# Patient Record
Sex: MALE | Race: WHITE
[De-identification: names, ages, dates, MRNs, and addresses within clinical notes are randomized per-mention and may not be internally consistent; named-entity substitution may affect disease eponyms.]

---

## 2020-07-22 ENCOUNTER — HOSPITAL ENCOUNTER (OUTPATIENT)
Dept: HOSPITAL 8 - CVU | Age: 50
Discharge: HOME | End: 2020-07-22
Attending: FAMILY MEDICINE
Payer: COMMERCIAL

## 2020-07-22 DIAGNOSIS — E78.2: ICD-10-CM

## 2020-07-22 DIAGNOSIS — I70.203: Primary | ICD-10-CM

## 2020-07-22 DIAGNOSIS — E11.622: ICD-10-CM

## 2020-07-22 DIAGNOSIS — L98.499: ICD-10-CM

## 2020-07-22 DIAGNOSIS — E11.65: ICD-10-CM

## 2020-07-22 PROCEDURE — 93922 UPR/L XTREMITY ART 2 LEVELS: CPT

## 2020-07-22 PROCEDURE — 93925 LOWER EXTREMITY STUDY: CPT

## 2020-07-31 ENCOUNTER — HOSPITAL ENCOUNTER (OUTPATIENT)
Dept: HOSPITAL 8 - WOUND | Age: 50
Discharge: HOME | End: 2020-07-31
Attending: FAMILY MEDICINE
Payer: COMMERCIAL

## 2020-07-31 DIAGNOSIS — E11.622: ICD-10-CM

## 2020-07-31 DIAGNOSIS — L97.811: ICD-10-CM

## 2020-07-31 DIAGNOSIS — E66.01: ICD-10-CM

## 2020-07-31 DIAGNOSIS — I87.313: Primary | ICD-10-CM

## 2020-07-31 DIAGNOSIS — L97.821: ICD-10-CM

## 2020-07-31 DIAGNOSIS — E78.5: ICD-10-CM

## 2020-07-31 DIAGNOSIS — L40.8: ICD-10-CM

## 2020-07-31 DIAGNOSIS — E11.21: ICD-10-CM

## 2020-07-31 PROCEDURE — 99214 OFFICE O/P EST MOD 30 MIN: CPT

## 2020-08-07 ENCOUNTER — HOSPITAL ENCOUNTER (OUTPATIENT)
Dept: HOSPITAL 8 - WOUND | Age: 50
Discharge: HOME | End: 2020-08-07
Attending: FAMILY MEDICINE
Payer: COMMERCIAL

## 2020-08-07 DIAGNOSIS — L40.8: ICD-10-CM

## 2020-08-07 DIAGNOSIS — E66.01: ICD-10-CM

## 2020-08-07 DIAGNOSIS — E78.2: ICD-10-CM

## 2020-08-07 DIAGNOSIS — I70.245: ICD-10-CM

## 2020-08-07 DIAGNOSIS — I87.313: Primary | ICD-10-CM

## 2020-08-07 DIAGNOSIS — E11.21: ICD-10-CM

## 2020-08-07 DIAGNOSIS — E11.621: ICD-10-CM

## 2020-08-07 DIAGNOSIS — I70.235: ICD-10-CM

## 2020-08-07 DIAGNOSIS — L97.521: ICD-10-CM

## 2020-08-07 DIAGNOSIS — L97.511: ICD-10-CM

## 2020-08-07 PROCEDURE — 99214 OFFICE O/P EST MOD 30 MIN: CPT

## 2020-08-21 ENCOUNTER — HOSPITAL ENCOUNTER (OUTPATIENT)
Dept: HOSPITAL 8 - WOUND | Age: 50
Discharge: HOME | End: 2020-08-21
Attending: FAMILY MEDICINE
Payer: COMMERCIAL

## 2020-08-21 DIAGNOSIS — L97.521: ICD-10-CM

## 2020-08-21 DIAGNOSIS — E11.621: ICD-10-CM

## 2020-08-21 DIAGNOSIS — L40.8: ICD-10-CM

## 2020-08-21 DIAGNOSIS — E11.21: ICD-10-CM

## 2020-08-21 DIAGNOSIS — I87.313: Primary | ICD-10-CM

## 2020-08-21 DIAGNOSIS — E66.01: ICD-10-CM

## 2020-08-21 DIAGNOSIS — E78.2: ICD-10-CM

## 2020-08-21 DIAGNOSIS — L97.511: ICD-10-CM

## 2020-08-21 PROCEDURE — 99214 OFFICE O/P EST MOD 30 MIN: CPT

## 2020-09-04 ENCOUNTER — HOSPITAL ENCOUNTER (OUTPATIENT)
Dept: HOSPITAL 8 - WOUND | Age: 50
Discharge: HOME | End: 2020-09-04
Attending: FAMILY MEDICINE
Payer: COMMERCIAL

## 2020-09-04 DIAGNOSIS — I87.313: Primary | ICD-10-CM

## 2020-09-04 DIAGNOSIS — E78.2: ICD-10-CM

## 2020-09-04 DIAGNOSIS — L97.528: ICD-10-CM

## 2020-09-04 DIAGNOSIS — L97.518: ICD-10-CM

## 2020-09-04 DIAGNOSIS — E66.01: ICD-10-CM

## 2020-09-04 DIAGNOSIS — L40.8: ICD-10-CM

## 2020-09-04 DIAGNOSIS — E11.21: ICD-10-CM

## 2020-09-04 DIAGNOSIS — E11.621: ICD-10-CM

## 2020-09-04 PROCEDURE — 99213 OFFICE O/P EST LOW 20 MIN: CPT

## 2020-10-01 ENCOUNTER — HOSPITAL ENCOUNTER (INPATIENT)
Dept: HOSPITAL 8 - ED | Age: 50
LOS: 2 days | Discharge: HOME | DRG: 603 | End: 2020-10-03
Attending: INTERNAL MEDICINE | Admitting: HOSPITALIST
Payer: COMMERCIAL

## 2020-10-01 VITALS — HEIGHT: 71 IN | BODY MASS INDEX: 44.1 KG/M2 | WEIGHT: 315 LBS

## 2020-10-01 VITALS — SYSTOLIC BLOOD PRESSURE: 119 MMHG | DIASTOLIC BLOOD PRESSURE: 86 MMHG

## 2020-10-01 DIAGNOSIS — B35.4: ICD-10-CM

## 2020-10-01 DIAGNOSIS — E87.2: ICD-10-CM

## 2020-10-01 DIAGNOSIS — E11.9: ICD-10-CM

## 2020-10-01 DIAGNOSIS — L03.116: Primary | ICD-10-CM

## 2020-10-01 DIAGNOSIS — I10: ICD-10-CM

## 2020-10-01 DIAGNOSIS — E78.5: ICD-10-CM

## 2020-10-01 DIAGNOSIS — B95.5: ICD-10-CM

## 2020-10-01 DIAGNOSIS — L03.115: ICD-10-CM

## 2020-10-01 DIAGNOSIS — E66.9: ICD-10-CM

## 2020-10-01 LAB
ALBUMIN SERPL-MCNC: 3.3 G/DL (ref 3.4–5)
ALP SERPL-CCNC: 52 U/L (ref 45–117)
ALT SERPL-CCNC: 27 U/L (ref 12–78)
ANION GAP SERPL CALC-SCNC: 6 MMOL/L (ref 5–15)
BASOPHILS # BLD AUTO: 0.04 X10^3/UL (ref 0–0.1)
BASOPHILS NFR BLD AUTO: 0 % (ref 0–1)
BILIRUB SERPL-MCNC: 0.4 MG/DL (ref 0.2–1)
CALCIUM SERPL-MCNC: 9.8 MG/DL (ref 8.5–10.1)
CHLORIDE SERPL-SCNC: 99 MMOL/L (ref 98–107)
CREAT SERPL-MCNC: 1.26 MG/DL (ref 0.7–1.3)
EOSINOPHIL # BLD AUTO: 0.12 X10^3/UL (ref 0–0.4)
EOSINOPHIL NFR BLD AUTO: 1 % (ref 1–7)
ERYTHROCYTE [DISTWIDTH] IN BLOOD BY AUTOMATED COUNT: 14.6 % (ref 9.4–14.8)
EST. AVERAGE GLUCOSE BLD GHB EST-MCNC: 174 MG/DL (ref 0–126)
LYMPHOCYTES # BLD AUTO: 1.17 X10^3/UL (ref 1–3.4)
LYMPHOCYTES NFR BLD AUTO: 10 % (ref 22–44)
MCH RBC QN AUTO: 26.9 PG (ref 27.5–34.5)
MCHC RBC AUTO-ENTMCNC: 32.5 G/DL (ref 33.2–36.2)
MD: NO
MICROSCOPIC: (no result)
MONOCYTES # BLD AUTO: 1 X10^3/UL (ref 0.2–0.8)
MONOCYTES NFR BLD AUTO: 8 % (ref 2–9)
NEUTROPHILS # BLD AUTO: 9.55 X10^3/UL (ref 1.8–6.8)
NEUTROPHILS NFR BLD AUTO: 80 % (ref 42–75)
PLATELET # BLD AUTO: 292 X10^3/UL (ref 130–400)
PMV BLD AUTO: 8.7 FL (ref 7.4–10.4)
PROT SERPL-MCNC: 8.4 G/DL (ref 6.4–8.2)
RBC # BLD AUTO: 5.88 X10^6/UL (ref 4.38–5.82)

## 2020-10-01 PROCEDURE — 85025 COMPLETE CBC W/AUTO DIFF WBC: CPT

## 2020-10-01 PROCEDURE — 90686 IIV4 VACC NO PRSV 0.5 ML IM: CPT

## 2020-10-01 PROCEDURE — 83036 HEMOGLOBIN GLYCOSYLATED A1C: CPT

## 2020-10-01 PROCEDURE — 96372 THER/PROPH/DIAG INJ SC/IM: CPT

## 2020-10-01 PROCEDURE — 96360 HYDRATION IV INFUSION INIT: CPT

## 2020-10-01 PROCEDURE — 82962 GLUCOSE BLOOD TEST: CPT

## 2020-10-01 PROCEDURE — 81001 URINALYSIS AUTO W/SCOPE: CPT

## 2020-10-01 PROCEDURE — 36415 COLL VENOUS BLD VENIPUNCTURE: CPT

## 2020-10-01 PROCEDURE — 93005 ELECTROCARDIOGRAM TRACING: CPT

## 2020-10-01 PROCEDURE — 83605 ASSAY OF LACTIC ACID: CPT

## 2020-10-01 PROCEDURE — 87040 BLOOD CULTURE FOR BACTERIA: CPT

## 2020-10-01 PROCEDURE — 80053 COMPREHEN METABOLIC PANEL: CPT

## 2020-10-01 PROCEDURE — 80048 BASIC METABOLIC PNL TOTAL CA: CPT

## 2020-10-01 RX ADMIN — ATORVASTATIN CALCIUM SCH MG: 10 TABLET, FILM COATED ORAL at 21:27

## 2020-10-01 RX ADMIN — HEPARIN SODIUM SCH UNITS: 5000 INJECTION, SOLUTION INTRAVENOUS; SUBCUTANEOUS at 20:30

## 2020-10-01 RX ADMIN — INSULIN HUMAN SCH UNITS: 100 INJECTION, SOLUTION PARENTERAL at 21:28

## 2020-10-01 RX ADMIN — AMPICILLIN SODIUM AND SULBACTAM SODIUM SCH MLS/HR: 2; 1 INJECTION, POWDER, FOR SOLUTION INTRAMUSCULAR; INTRAVENOUS at 19:06

## 2020-10-01 RX ADMIN — DOXYCYCLINE SCH MLS/HR: 100 INJECTION, POWDER, LYOPHILIZED, FOR SOLUTION INTRAVENOUS at 20:33

## 2020-10-01 NOTE — NUR
piv placed from which 1 set of blood cultures drawn, lab at bedside for rest of 
septic workup labs including 2nd set of blood cultures

cc ua obtained and sent as well

## 2020-10-01 NOTE — NUR
PRIOR TO TRANSFER UPSTAIRS PROVIDED WITH MEAL, TOLD TO WAIT UNTIL ACCUCHECK FOR 
SLIDING SCALE COVERAGE



UN ADMINISTERED DOXYCYCLINE SENT WITH PATIENT

## 2020-10-01 NOTE — NUR
SEEN [facility]  TELEMEDICINE. CELLULITIS OF RIGHT THIGH AND INTO GROIN.

DIABETIC

HAS HAD CHILLS AND TMAX OF 99. -130

PROVIDER TO BEDSIDE: FULL SEPSIS WORKUP AND ADMIT



LINE OF DEMARCATION TO RIGHT THIGH DRAWN AT 1700 10/1/20

## 2020-10-01 NOTE — NUR
REDNESS RECEDING FROM LINE OF DEMARCATION

MEDICATED FOR PAIN AS WELL AS INPATIENT ABX 1/2 AS WELL AS MIVF

## 2020-10-02 VITALS — DIASTOLIC BLOOD PRESSURE: 86 MMHG | SYSTOLIC BLOOD PRESSURE: 131 MMHG

## 2020-10-02 VITALS — SYSTOLIC BLOOD PRESSURE: 109 MMHG | DIASTOLIC BLOOD PRESSURE: 62 MMHG

## 2020-10-02 VITALS — SYSTOLIC BLOOD PRESSURE: 107 MMHG | DIASTOLIC BLOOD PRESSURE: 71 MMHG

## 2020-10-02 VITALS — SYSTOLIC BLOOD PRESSURE: 106 MMHG | DIASTOLIC BLOOD PRESSURE: 66 MMHG

## 2020-10-02 LAB
ANION GAP SERPL CALC-SCNC: 5 MMOL/L (ref 5–15)
BASOPHILS # BLD AUTO: 0.03 X10^3/UL (ref 0–0.1)
BASOPHILS NFR BLD AUTO: 0 % (ref 0–1)
CALCIUM SERPL-MCNC: 9.1 MG/DL (ref 8.5–10.1)
CHLORIDE SERPL-SCNC: 105 MMOL/L (ref 98–107)
CREAT SERPL-MCNC: 1.09 MG/DL (ref 0.7–1.3)
EOSINOPHIL # BLD AUTO: 0.41 X10^3/UL (ref 0–0.4)
EOSINOPHIL NFR BLD AUTO: 4 % (ref 1–7)
ERYTHROCYTE [DISTWIDTH] IN BLOOD BY AUTOMATED COUNT: 14.8 % (ref 9.4–14.8)
LYMPHOCYTES # BLD AUTO: 1.61 X10^3/UL (ref 1–3.4)
LYMPHOCYTES NFR BLD AUTO: 15 % (ref 22–44)
MCH RBC QN AUTO: 26.4 PG (ref 27.5–34.5)
MCHC RBC AUTO-ENTMCNC: 31.7 G/DL (ref 33.2–36.2)
MD: NO
MONOCYTES # BLD AUTO: 1.38 X10^3/UL (ref 0.2–0.8)
MONOCYTES NFR BLD AUTO: 13 % (ref 2–9)
NEUTROPHILS # BLD AUTO: 7.63 X10^3/UL (ref 1.8–6.8)
NEUTROPHILS NFR BLD AUTO: 69 % (ref 42–75)
PLATELET # BLD AUTO: 271 X10^3/UL (ref 130–400)
PMV BLD AUTO: 8.5 FL (ref 7.4–10.4)
RBC # BLD AUTO: 5.51 X10^6/UL (ref 4.38–5.82)

## 2020-10-02 RX ADMIN — AMPICILLIN SODIUM AND SULBACTAM SODIUM SCH MLS/HR: 2; 1 INJECTION, POWDER, FOR SOLUTION INTRAMUSCULAR; INTRAVENOUS at 18:37

## 2020-10-02 RX ADMIN — LISINOPRIL SCH MG: 5 TABLET ORAL at 07:55

## 2020-10-02 RX ADMIN — KETOCONAZOLE SCH APPLIC: 20 CREAM TOPICAL at 16:08

## 2020-10-02 RX ADMIN — HEPARIN SODIUM SCH UNITS: 5000 INJECTION, SOLUTION INTRAVENOUS; SUBCUTANEOUS at 03:11

## 2020-10-02 RX ADMIN — DOXYCYCLINE SCH MLS/HR: 100 INJECTION, POWDER, LYOPHILIZED, FOR SOLUTION INTRAVENOUS at 09:22

## 2020-10-02 RX ADMIN — INSULIN HUMAN SCH UNITS: 100 INJECTION, SOLUTION PARENTERAL at 16:21

## 2020-10-02 RX ADMIN — INSULIN HUMAN SCH UNITS: 100 INJECTION, SOLUTION PARENTERAL at 20:58

## 2020-10-02 RX ADMIN — INSULIN HUMAN SCH UNITS: 100 INJECTION, SOLUTION PARENTERAL at 11:40

## 2020-10-02 RX ADMIN — AMPICILLIN SODIUM AND SULBACTAM SODIUM SCH MLS/HR: 2; 1 INJECTION, POWDER, FOR SOLUTION INTRAMUSCULAR; INTRAVENOUS at 03:07

## 2020-10-02 RX ADMIN — INSULIN HUMAN SCH UNITS: 100 INJECTION, SOLUTION PARENTERAL at 08:06

## 2020-10-02 RX ADMIN — HEPARIN SODIUM SCH UNITS: 5000 INJECTION, SOLUTION INTRAVENOUS; SUBCUTANEOUS at 12:30

## 2020-10-02 RX ADMIN — LINAGLIPTIN SCH MG: 5 TABLET, FILM COATED ORAL at 08:06

## 2020-10-02 RX ADMIN — BUDESONIDE AND FORMOTEROL FUMARATE DIHYDRATE SCH TAB: 160; 4.5 AEROSOL RESPIRATORY (INHALATION) at 09:00

## 2020-10-02 RX ADMIN — DOXYCYCLINE SCH MLS/HR: 100 INJECTION, POWDER, LYOPHILIZED, FOR SOLUTION INTRAVENOUS at 20:58

## 2020-10-02 RX ADMIN — ACETAMINOPHEN PRN MG: 325 TABLET, FILM COATED ORAL at 11:38

## 2020-10-02 RX ADMIN — KETOCONAZOLE SCH APPLIC: 20 CREAM TOPICAL at 21:28

## 2020-10-02 RX ADMIN — FENOFIBRATE SCH MG: 145 TABLET, FILM COATED ORAL at 07:56

## 2020-10-02 RX ADMIN — AMPICILLIN SODIUM AND SULBACTAM SODIUM SCH MLS/HR: 2; 1 INJECTION, POWDER, FOR SOLUTION INTRAMUSCULAR; INTRAVENOUS at 11:18

## 2020-10-02 RX ADMIN — ATORVASTATIN CALCIUM SCH MG: 10 TABLET, FILM COATED ORAL at 20:57

## 2020-10-02 RX ADMIN — HEPARIN SODIUM SCH UNITS: 5000 INJECTION, SOLUTION INTRAVENOUS; SUBCUTANEOUS at 20:30

## 2020-10-03 VITALS — SYSTOLIC BLOOD PRESSURE: 110 MMHG | DIASTOLIC BLOOD PRESSURE: 70 MMHG

## 2020-10-03 VITALS — DIASTOLIC BLOOD PRESSURE: 79 MMHG | SYSTOLIC BLOOD PRESSURE: 120 MMHG

## 2020-10-03 RX ADMIN — ACETAMINOPHEN PRN MG: 325 TABLET, FILM COATED ORAL at 09:34

## 2020-10-03 RX ADMIN — FENOFIBRATE SCH MG: 145 TABLET, FILM COATED ORAL at 08:46

## 2020-10-03 RX ADMIN — INSULIN HUMAN SCH UNITS: 100 INJECTION, SOLUTION PARENTERAL at 11:38

## 2020-10-03 RX ADMIN — KETOCONAZOLE SCH APPLIC: 20 CREAM TOPICAL at 09:00

## 2020-10-03 RX ADMIN — AMPICILLIN SODIUM AND SULBACTAM SODIUM SCH MLS/HR: 2; 1 INJECTION, POWDER, FOR SOLUTION INTRAMUSCULAR; INTRAVENOUS at 03:00

## 2020-10-03 RX ADMIN — HEPARIN SODIUM SCH UNITS: 5000 INJECTION, SOLUTION INTRAVENOUS; SUBCUTANEOUS at 11:34

## 2020-10-03 RX ADMIN — LISINOPRIL SCH MG: 5 TABLET ORAL at 08:46

## 2020-10-03 RX ADMIN — AMPICILLIN SODIUM AND SULBACTAM SODIUM SCH MLS/HR: 2; 1 INJECTION, POWDER, FOR SOLUTION INTRAMUSCULAR; INTRAVENOUS at 11:28

## 2020-10-03 RX ADMIN — DOXYCYCLINE SCH MLS/HR: 100 INJECTION, POWDER, LYOPHILIZED, FOR SOLUTION INTRAVENOUS at 09:33

## 2020-10-03 RX ADMIN — HEPARIN SODIUM SCH UNITS: 5000 INJECTION, SOLUTION INTRAVENOUS; SUBCUTANEOUS at 03:07

## 2020-10-03 RX ADMIN — BUDESONIDE AND FORMOTEROL FUMARATE DIHYDRATE SCH TAB: 160; 4.5 AEROSOL RESPIRATORY (INHALATION) at 08:44

## 2020-10-03 RX ADMIN — INSULIN HUMAN SCH UNITS: 100 INJECTION, SOLUTION PARENTERAL at 07:38

## 2020-10-03 RX ADMIN — LINAGLIPTIN SCH MG: 5 TABLET, FILM COATED ORAL at 08:46

## 2024-10-22 ENCOUNTER — HOSPITAL ENCOUNTER (OUTPATIENT)
Facility: MEDICAL CENTER | Age: 54
End: 2024-10-22
Attending: STUDENT IN AN ORGANIZED HEALTH CARE EDUCATION/TRAINING PROGRAM
Payer: COMMERCIAL

## 2024-10-22 ENCOUNTER — OFFICE VISIT (OUTPATIENT)
Dept: MEDICAL GROUP | Facility: PHYSICIAN GROUP | Age: 54
End: 2024-10-22
Payer: COMMERCIAL

## 2024-10-22 VITALS
RESPIRATION RATE: 14 BRPM | SYSTOLIC BLOOD PRESSURE: 122 MMHG | TEMPERATURE: 96.5 F | BODY MASS INDEX: 44.1 KG/M2 | OXYGEN SATURATION: 94 % | DIASTOLIC BLOOD PRESSURE: 80 MMHG | HEIGHT: 71 IN | HEART RATE: 96 BPM | WEIGHT: 315 LBS

## 2024-10-22 DIAGNOSIS — E11.9 TYPE 2 DIABETES MELLITUS WITHOUT COMPLICATION, WITHOUT LONG-TERM CURRENT USE OF INSULIN (HCC): ICD-10-CM

## 2024-10-22 DIAGNOSIS — Z00.00 ANNUAL PHYSICAL EXAM: ICD-10-CM

## 2024-10-22 DIAGNOSIS — E78.2 MIXED HYPERLIPIDEMIA: ICD-10-CM

## 2024-10-22 DIAGNOSIS — Z11.59 NEED FOR HEPATITIS C SCREENING TEST: ICD-10-CM

## 2024-10-22 DIAGNOSIS — E66.01 MORBID OBESITY WITH BMI OF 45.0-49.9, ADULT (HCC): ICD-10-CM

## 2024-10-22 LAB
CREAT UR-MCNC: 77.44 MG/DL
MICROALBUMIN UR-MCNC: 4.2 MG/DL
MICROALBUMIN/CREAT UR: 54 MG/G (ref 0–30)

## 2024-10-22 PROCEDURE — 82043 UR ALBUMIN QUANTITATIVE: CPT

## 2024-10-22 PROCEDURE — 82570 ASSAY OF URINE CREATININE: CPT

## 2024-10-22 PROCEDURE — 99386 PREV VISIT NEW AGE 40-64: CPT | Performed by: STUDENT IN AN ORGANIZED HEALTH CARE EDUCATION/TRAINING PROGRAM

## 2024-10-22 RX ORDER — TAMSULOSIN HYDROCHLORIDE 0.4 MG/1
CAPSULE ORAL
COMMUNITY
End: 2024-10-22

## 2024-10-22 RX ORDER — HYDROCHLOROTHIAZIDE 25 MG/1
TABLET ORAL
COMMUNITY
End: 2024-10-22

## 2024-10-22 RX ORDER — DAPAGLIFLOZIN 10 MG/1
1 TABLET, FILM COATED ORAL DAILY
COMMUNITY
End: 2024-10-22

## 2024-10-22 RX ORDER — LISINOPRIL 5 MG/1
1 TABLET ORAL DAILY
COMMUNITY
End: 2024-10-22

## 2024-10-22 RX ORDER — FLUTICASONE PROPIONATE 50 MCG
SPRAY, SUSPENSION (ML) NASAL
COMMUNITY

## 2024-10-22 RX ORDER — FENOFIBRATE 145 MG/1
TABLET, COATED ORAL
COMMUNITY
End: 2024-10-22

## 2024-10-22 RX ORDER — ROSUVASTATIN CALCIUM 40 MG/1
40 TABLET, COATED ORAL DAILY
Qty: 100 TABLET | Refills: 2 | Status: SHIPPED | OUTPATIENT
Start: 2024-10-22

## 2024-10-22 RX ORDER — PIOGLITAZONE HCL AND METFORMIN HCL 850; 15 MG/1; MG/1
TABLET ORAL
COMMUNITY

## 2024-10-22 RX ORDER — ORAL SEMAGLUTIDE 7 MG/1
TABLET ORAL
COMMUNITY
End: 2024-10-22

## 2024-10-22 RX ORDER — ROSUVASTATIN CALCIUM 40 MG/1
1 TABLET, COATED ORAL DAILY
COMMUNITY
End: 2024-10-22 | Stop reason: SDUPTHER

## 2024-10-22 ASSESSMENT — ENCOUNTER SYMPTOMS
NAUSEA: 0
CHILLS: 0
WHEEZING: 0
PALPITATIONS: 0
DIZZINESS: 0
COUGH: 0
SHORTNESS OF BREATH: 0
ABDOMINAL PAIN: 0
VOMITING: 0
FEVER: 0
ORTHOPNEA: 0
HEARTBURN: 0
HEADACHES: 0
FOCAL WEAKNESS: 0

## 2024-10-22 ASSESSMENT — PATIENT HEALTH QUESTIONNAIRE - PHQ9: CLINICAL INTERPRETATION OF PHQ2 SCORE: 0

## 2024-11-05 ENCOUNTER — OFFICE VISIT (OUTPATIENT)
Dept: MEDICAL GROUP | Facility: PHYSICIAN GROUP | Age: 54
End: 2024-11-05
Payer: COMMERCIAL

## 2024-11-05 VITALS
OXYGEN SATURATION: 95 % | BODY MASS INDEX: 44.1 KG/M2 | HEART RATE: 97 BPM | DIASTOLIC BLOOD PRESSURE: 79 MMHG | SYSTOLIC BLOOD PRESSURE: 138 MMHG | WEIGHT: 315 LBS | TEMPERATURE: 98 F | HEIGHT: 71 IN | RESPIRATION RATE: 16 BRPM

## 2024-11-05 DIAGNOSIS — E11.29 TYPE 2 DIABETES MELLITUS WITH DIABETIC MICROALBUMINURIA, WITHOUT LONG-TERM CURRENT USE OF INSULIN (HCC): ICD-10-CM

## 2024-11-05 DIAGNOSIS — E78.1 HIGH TRIGLYCERIDES: ICD-10-CM

## 2024-11-05 DIAGNOSIS — R80.9 TYPE 2 DIABETES MELLITUS WITH DIABETIC MICROALBUMINURIA, WITHOUT LONG-TERM CURRENT USE OF INSULIN (HCC): ICD-10-CM

## 2024-11-05 DIAGNOSIS — E11.9 TYPE 2 DIABETES MELLITUS WITHOUT COMPLICATION, WITHOUT LONG-TERM CURRENT USE OF INSULIN (HCC): Primary | ICD-10-CM

## 2024-11-05 LAB
HBA1C MFR BLD: 9.4 % (ref ?–5.8)
POCT INT CON NEG: NEGATIVE
POCT INT CON POS: POSITIVE

## 2024-11-05 PROCEDURE — 83036 HEMOGLOBIN GLYCOSYLATED A1C: CPT | Performed by: STUDENT IN AN ORGANIZED HEALTH CARE EDUCATION/TRAINING PROGRAM

## 2024-11-05 PROCEDURE — 99214 OFFICE O/P EST MOD 30 MIN: CPT | Performed by: STUDENT IN AN ORGANIZED HEALTH CARE EDUCATION/TRAINING PROGRAM

## 2024-11-05 PROCEDURE — 3078F DIAST BP <80 MM HG: CPT | Performed by: STUDENT IN AN ORGANIZED HEALTH CARE EDUCATION/TRAINING PROGRAM

## 2024-11-05 PROCEDURE — 3074F SYST BP LT 130 MM HG: CPT | Performed by: STUDENT IN AN ORGANIZED HEALTH CARE EDUCATION/TRAINING PROGRAM

## 2024-11-05 RX ORDER — CHLORAL HYDRATE 500 MG
1000 CAPSULE ORAL 2 TIMES DAILY
Qty: 180 CAPSULE | Refills: 2 | Status: SHIPPED | OUTPATIENT
Start: 2024-11-05

## 2024-11-05 RX ORDER — ACYCLOVIR 400 MG/1
TABLET ORAL
Qty: 4 EACH | Refills: 6 | Status: SHIPPED | OUTPATIENT
Start: 2024-11-05

## 2024-11-05 RX ORDER — LISINOPRIL 5 MG/1
5 TABLET ORAL DAILY
Qty: 90 TABLET | Refills: 2 | Status: SHIPPED | OUTPATIENT
Start: 2024-11-05

## 2024-11-05 RX ORDER — ACYCLOVIR 400 MG/1
TABLET ORAL
Qty: 1 EACH | Refills: 0 | Status: SHIPPED | OUTPATIENT
Start: 2024-11-05

## 2024-11-05 NOTE — PROGRESS NOTES
Verbal Consent given for MARKO recording software    HISTORY OF PRESENT ILLNESS: Mikie is a pleasant 54 y.o. male, established patient who presents today to discuss medical problems as listed below:    History of Present Illness  The patient is a 54-year-old male who presents for a follow-up on his diabetes, cholesterol labs, and blood pressure.    He has been without Rybelsus for several months due to insurance coverage issues. His current medications include metformin and Actos. He does not monitor his blood sugar levels at home. He reports no neuropathy in his feet and has not had an eye examination this year. He was previously on lisinopril but has been off it for a month.    He is currently taking Crestor 40 mg. He has increased his physical activity by walking more, but he believes it is not sufficient to significantly impact his cholesterol levels. He has reduced his consumption of fast food when at home but admits to not cooking healthy meals.       Current Outpatient Medications Ordered in Epic   Medication Sig Dispense Refill    Semaglutide 3 MG Tab Take 3 mg by mouth every day for 90 days. 90 Tablet 0    Omega-3 Fatty Acids (FISH OIL) 1000 MG Cap capsule Take 1 Capsule by mouth 2 times a day. 180 Capsule 2    Continuous Glucose Sensor (DEXCOM G7 SENSOR) Misc For continuous glucose monitoring 4 Each 6    Continuous Glucose  (DEXCOM G7 ) Device For continuous glucose monitoring 1 Each 0    lisinopril (PRINIVIL) 5 MG Tab Take 1 Tablet by mouth every day. 90 Tablet 2    fluticasone (FLONASE) 50 MCG/ACT nasal spray SPRAY 1 SPRAY INTO EACH NOSTRIL EVERY DAY FOR 90 DAYS      pioglitazone-metformin (ACTOPLUS MET)  MG per tablet TAKE 1 TABLET BY MOUTH TWICE A DAY FOR 90 DAYS      rosuvastatin (CRESTOR) 40 MG tablet Take 1 Tablet by mouth every day. 100 Tablet 2     No current Epic-ordered facility-administered medications on file.       Review of systems:  Per HPI    Patient Active Problem  "List    Diagnosis Date Noted    Type 2 diabetes mellitus with diabetic microalbuminuria, without long-term current use of insulin ([facility]) 10/22/2024    Morbid obesity with BMI of 45.0-49.9, adult ([facility]) 10/22/2024     Past Surgical History:   Procedure Laterality Date    ORIF, ANKLE Right     TRANS URETHRAL RESECTION PROSTATE       Social History     Tobacco Use    Smoking status: Never    Smokeless tobacco: Never   Vaping Use    Vaping status: Never Used   Substance Use Topics    Alcohol use: Never    Drug use: Never      Family History   Problem Relation Age of Onset    Diabetes Mother     No Known Problems Father     Hypertension Sister     No Known Problems Brother      Current Outpatient Medications   Medication Sig Dispense Refill    Semaglutide 3 MG Tab Take 3 mg by mouth every day for 90 days. 90 Tablet 0    Omega-3 Fatty Acids (FISH OIL) 1000 MG Cap capsule Take 1 Capsule by mouth 2 times a day. 180 Capsule 2    Continuous Glucose Sensor (DEXCOM G7 SENSOR) Misc For continuous glucose monitoring 4 Each 6    Continuous Glucose  (DEXCOM G7 ) Device For continuous glucose monitoring 1 Each 0    lisinopril (PRINIVIL) 5 MG Tab Take 1 Tablet by mouth every day. 90 Tablet 2    fluticasone (FLONASE) 50 MCG/ACT nasal spray SPRAY 1 SPRAY INTO EACH NOSTRIL EVERY DAY FOR 90 DAYS      pioglitazone-metformin (ACTOPLUS MET)  MG per tablet TAKE 1 TABLET BY MOUTH TWICE A DAY FOR 90 DAYS      rosuvastatin (CRESTOR) 40 MG tablet Take 1 Tablet by mouth every day. 100 Tablet 2     No current facility-administered medications for this visit.       Allergies:  No Known Allergies    Allergies, past medical history, past surgical history, family history, social history reviewed and updated.    Objective:    /79 Comment: machine  Pulse 97   Temp 36.7 °C (98 °F) (Temporal)   Resp 16   Ht 1.803 m (5' 11\")   Wt (!) 151 kg (332 lb 14.3 oz)   SpO2 95%   BMI 46.43 kg/m²    Body mass index is 46.43 " kg/m².    Physical exam:  General: Normal appearance, no acute distress, not ill-appearing  HEENT: EOM intact, conjunctiva normal limits, negative right/left eye discharge.  Sclera anicteric  Cardiovascular: Normal rate and rhythm, no murmurs  Pulmonary: No respiratory distress, no wheezing, no rales, breath sounds normal.  Musculoskeletal: No edema bilaterally  Skin: Warm, dry, no lesions  Neurological: No focal deficits, normal gait  Psychiatric: Mood within normal limits    Monofilament testing with a 10 gram force: sensation intact: intact bilaterally  Visual Inspection: Feet without maceration, ulcers, fissures.  Pedal pulses: intact bilaterally      Assessment/Plan:    Assessment & Plan  1. Type 2 Diabetes Mellitus.  He is currently on metformin and pioglitazone but has not started Rybelsus due to insurance complications. His A1c level today is 9.4, indicating a need for improvement to a target of around 7 or below. A 90-day supply of Rybelsus at 3 mg will be provided, with instructions to increase the dosage to 6 mg after 30 days. He is advised to adopt a low carbohydrate diet, reduce calorie intake, increase protein consumption, and incorporate more physical activity into his routine. A continuous glucose monitor (Dexcom) will be provided for tracking blood sugar levels. If he encounters any difficulties in obtaining the glucose monitor, he should inform the clinic so that alternative arrangements can be made. His diabetic monofilament exam results are normal.    2. Hyperlipidemia.  He is advised to continue with rosuvastatin 40 mg. A fish oil capsule will be added to his regimen. His cholesterol levels will be rechecked in about 3 months. If his cholesterol levels remain high, fenofibrate may be considered as an additional treatment option.    3. Microalbuminuria.  Lisinopril 5 mg daily will be initiated to manage his microalbuminuria.    Follow-up  Return in 6 weeks for follow up.       Problem List Items  Addressed This Visit       Type 2 diabetes mellitus with diabetic microalbuminuria, without long-term current use of insulin (HCC) - Primary    Relevant Medications    Semaglutide 3 MG Tab    Continuous Glucose Sensor (DEXCOM G7 SENSOR) Misc    Continuous Glucose  (DEXCOM G7 ) Device    lisinopril (PRINIVIL) 5 MG Tab     Other Visit Diagnoses       High triglycerides        Relevant Medications    Omega-3 Fatty Acids (FISH OIL) 1000 MG Cap capsule    lisinopril (PRINIVIL) 5 MG Tab            Return in about 6 weeks (around 12/17/2024), or if symptoms worsen or fail to improve.

## 2024-12-20 DIAGNOSIS — E11.9 TYPE 2 DIABETES MELLITUS WITHOUT COMPLICATION, WITHOUT LONG-TERM CURRENT USE OF INSULIN (HCC): ICD-10-CM

## 2024-12-20 NOTE — TELEPHONE ENCOUNTER
Received request via: Patient    Was the patient seen in the last year in this department? Yes    Does the patient have an active prescription (recently filled or refills available) for medication(s) requested? No    Pharmacy Name: Pharmacy:   [facility] - [address]     Does the patient have [facility] and need 100-day supply? (This applies to ALL medications) Patient does not have SCP

## 2024-12-31 ENCOUNTER — OFFICE VISIT (OUTPATIENT)
Dept: MEDICAL GROUP | Facility: PHYSICIAN GROUP | Age: 54
End: 2024-12-31
Payer: COMMERCIAL

## 2024-12-31 VITALS
DIASTOLIC BLOOD PRESSURE: 76 MMHG | HEART RATE: 95 BPM | WEIGHT: 315 LBS | OXYGEN SATURATION: 94 % | SYSTOLIC BLOOD PRESSURE: 120 MMHG | BODY MASS INDEX: 44.1 KG/M2 | HEIGHT: 71 IN | RESPIRATION RATE: 16 BRPM | TEMPERATURE: 97.6 F

## 2024-12-31 DIAGNOSIS — R80.9 TYPE 2 DIABETES MELLITUS WITH DIABETIC MICROALBUMINURIA, WITHOUT LONG-TERM CURRENT USE OF INSULIN (HCC): ICD-10-CM

## 2024-12-31 DIAGNOSIS — E11.29 TYPE 2 DIABETES MELLITUS WITH DIABETIC MICROALBUMINURIA, WITHOUT LONG-TERM CURRENT USE OF INSULIN (HCC): ICD-10-CM

## 2024-12-31 DIAGNOSIS — E11.9 TYPE 2 DIABETES MELLITUS WITHOUT COMPLICATION, WITHOUT LONG-TERM CURRENT USE OF INSULIN (HCC): ICD-10-CM

## 2024-12-31 RX ORDER — FENOFIBRATE 145 MG/1
145 TABLET, COATED ORAL DAILY
COMMUNITY

## 2024-12-31 RX ORDER — NITROFURANTOIN 25; 75 MG/1; MG/1
CAPSULE ORAL
COMMUNITY
End: 2024-12-31

## 2024-12-31 RX ORDER — ORAL SEMAGLUTIDE 7 MG/1
7 TABLET ORAL DAILY
Qty: 90 TABLET | Refills: 2 | Status: SHIPPED | OUTPATIENT
Start: 2024-12-31

## 2024-12-31 NOTE — PROGRESS NOTES
Verbal Consent given for MARKO recording software    HISTORY OF PRESENT ILLNESS: Mikie is a pleasant 54 y.o. male, established patient who presents today to discuss medical problems as listed below:    History of Present Illness  The patient is a 54-year-old male presenting for follow-up on type 2 diabetes.    He manages his condition with pioglitazone and metformin. He ran out of oohilove and did not receive a call back from the office. He requests all prescriptions be sent to [facility] at [address] [place] [address] due to issues with [facility]. He completed a 30-day course of Rybelsus 3 mg and has been off it for 9 days. His blood glucose levels are around 225 without medication, but it was 274 this morning. He believes 7 mg would be more effective. He is reducing carbohydrate intake and walks his dog for physical activity. He is an , involving significant walking. He has one bottle of pioglitazone and metformin left. He plans an eye exam after the new year. No chest pain or leg swelling. His diet includes fast food in the mornings, with dinner and occasional lunch at home. He is reducing potato intake.    Supplemental Information  He resumed fenofibrate after finding a full bottle while unpacking.    SOCIAL HISTORY  - Employed as an , involves significant walking across the campus    MEDICATIONS  Pioglitazone, metformin, Rybelsus, fenofibrate       Current Outpatient Medications Ordered in Epic   Medication Sig Dispense Refill    fenofibrate (TRICOR) 145 MG Tab Take 145 mg by mouth every day.      Semaglutide (RYBELSUS) 7 MG Tab Take 7 mg by mouth every day. 90 Tablet 2    Omega-3 Fatty Acids (FISH OIL) 1000 MG Cap capsule Take 1 Capsule by mouth 2 times a day. 180 Capsule 2    Continuous Glucose Sensor (DEXCOM G7 SENSOR) Misc For continuous glucose monitoring 4 Each 6    Continuous Glucose  (DEXCOM G7 ) Device For continuous glucose monitoring 1 Each 0    lisinopril (PRINIVIL) 5 MG Tab  Take 1 Tablet by mouth every day. 90 Tablet 2    fluticasone (FLONASE) 50 MCG/ACT nasal spray SPRAY 1 SPRAY INTO EACH NOSTRIL EVERY DAY FOR 90 DAYS      pioglitazone-metformin (ACTOPLUS MET)  MG per tablet TAKE 1 TABLET BY MOUTH TWICE A DAY FOR 90 DAYS      rosuvastatin (CRESTOR) 40 MG tablet Take 1 Tablet by mouth every day. 100 Tablet 2     No current Epic-ordered facility-administered medications on file.       Review of systems:  Per \[facility] [place] [facility]\""    Patient Active Problem List    Diagnosis Date Noted    Type 2 diabetes mellitus with diabetic microalbuminuria, without long-term current use of insulin ([facility]) 10/22/2024    Morbid obesity with BMI of 45.0-49.9, adult ([facility]) 10/22/2024     Past Surgical History:   Procedure Laterality Date    ORIF, ANKLE Right     TRANS URETHRAL RESECTION PROSTATE       Social History     Tobacco Use    Smoking status: Never    Smokeless tobacco: Never   Vaping Use    Vaping status: Never Used   Substance Use Topics    Alcohol use: Never    Drug use: Never      Family History   Problem Relation Age of Onset    Diabetes Mother     No Known Problems Father     Hypertension Sister     No Known Problems Brother      Current Outpatient Medications   Medication Sig Dispense Refill    fenofibrate (TRICOR) 145 MG Tab Take 145 mg by mouth every day.      Semaglutide (RYBELSUS) 7 MG Tab Take 7 mg by mouth every day. 90 Tablet 2    Omega-3 Fatty Acids (FISH OIL) 1000 MG Cap capsule Take 1 Capsule by mouth 2 times a day. 180 Capsule 2    Continuous Glucose Sensor (DEXCOM G7 SENSOR) Misc For continuous glucose monitoring 4 Each 6    Continuous Glucose  (DEXCOM G7 ) Device For continuous glucose monitoring 1 Each 0    lisinopril (PRINIVIL) 5 MG Tab Take 1 Tablet by mouth every day. 90 Tablet 2    fluticasone (FLONASE) 50 MCG/ACT nasal spray SPRAY 1 SPRAY INTO EACH NOSTRIL EVERY DAY FOR 90 DAYS      pioglitazone-metformin (ACTOPLUS MET)  MG per tablet TAKE 1 TABLET BY MOUTH TWICE A  "DAY FOR 90 DAYS      rosuvastatin (CRESTOR) 40 MG tablet Take 1 Tablet by mouth every day. 100 Tablet 2     No current facility-administered medications for this visit.       Allergies:  No Known Allergies    Allergies, past medical history, past surgical history, family history, social history reviewed and updated.    Objective:    /76   Pulse 95   Temp 36.4 °C (97.6 °F) (Temporal)   Resp 16   Ht 1.803 m (5' 11\")   Wt (!) 156 kg (343 lb 14.7 oz)   SpO2 94%   BMI 47.97 kg/m²    Body mass index is 47.97 kg/m².    Physical exam:  General: Normal appearance, no acute distress, not ill-appearing  HEENT: EOM intact, conjunctiva normal limits, negative right/left eye discharge.  Sclera anicteric  Cardiovascular: Normal rate and rhythm, no murmurs  Pulmonary: No respiratory distress, no wheezing, no rales, breath sounds normal.  Musculoskeletal: No edema bilaterally  Skin: Warm, dry, no lesions  Neurological: No focal deficits, normal gait  Psychiatric: Mood within normal limits    Assessment/Plan:    Assessment & Plan  1. Type 2 Diabetes Mellitus - Managing with pioglitazone and metformin. Ran out of Rybelsus and did not receive a call back. Completed 30-day course of Rybelsus 3 mg, off for 9 days. Blood glucose around 225 without medication, 274 this morning.   - Advised to limit carbohydrates, increase protein, prepare meals at home, and avoid fast food.  - Regular exercise recommended.  - increase Rybelsus 7 mg to be sent to [facility]  - Continue pioglitazone and metformin.  - Retinal screening today.  - A1c test next visit in February 2025.    Follow-up in February 2025.       Problem List Items Addressed This Visit       Type 2 diabetes mellitus with diabetic microalbuminuria, without long-term current use of insulin (HCC)    Relevant Medications    Semaglutide (RYBELSUS) 7 MG Tab    Other Relevant Orders    POCT Retinal Eye Exam     Other Visit Diagnoses       Type 2 diabetes mellitus without complication, " without long-term current use of insulin (HCC)        Relevant Medications    Semaglutide (RYBELSUS) 7 MG Tab            Return in about 3 months (around 3/31/2025), or if symptoms worsen or fail to improve.

## 2025-02-05 LAB — RETINAL SCREEN: NEGATIVE

## 2025-02-20 ENCOUNTER — OFFICE VISIT (OUTPATIENT)
Dept: MEDICAL GROUP | Facility: PHYSICIAN GROUP | Age: 55
End: 2025-02-20
Payer: COMMERCIAL

## 2025-02-20 VITALS
HEART RATE: 97 BPM | WEIGHT: 315 LBS | SYSTOLIC BLOOD PRESSURE: 100 MMHG | HEIGHT: 71 IN | BODY MASS INDEX: 44.1 KG/M2 | OXYGEN SATURATION: 94 % | RESPIRATION RATE: 14 BRPM | TEMPERATURE: 96.7 F | DIASTOLIC BLOOD PRESSURE: 72 MMHG

## 2025-02-20 DIAGNOSIS — R80.9 TYPE 2 DIABETES MELLITUS WITH DIABETIC MICROALBUMINURIA, WITHOUT LONG-TERM CURRENT USE OF INSULIN (HCC): ICD-10-CM

## 2025-02-20 DIAGNOSIS — E11.29 TYPE 2 DIABETES MELLITUS WITH DIABETIC MICROALBUMINURIA, WITHOUT LONG-TERM CURRENT USE OF INSULIN (HCC): ICD-10-CM

## 2025-02-20 DIAGNOSIS — E11.9 TYPE 2 DIABETES MELLITUS WITHOUT COMPLICATION, WITHOUT LONG-TERM CURRENT USE OF INSULIN (HCC): ICD-10-CM

## 2025-02-20 PROCEDURE — 99214 OFFICE O/P EST MOD 30 MIN: CPT | Performed by: STUDENT IN AN ORGANIZED HEALTH CARE EDUCATION/TRAINING PROGRAM

## 2025-02-20 PROCEDURE — 3078F DIAST BP <80 MM HG: CPT | Performed by: STUDENT IN AN ORGANIZED HEALTH CARE EDUCATION/TRAINING PROGRAM

## 2025-02-20 PROCEDURE — 3074F SYST BP LT 130 MM HG: CPT | Performed by: STUDENT IN AN ORGANIZED HEALTH CARE EDUCATION/TRAINING PROGRAM

## 2025-02-20 RX ORDER — ACYCLOVIR 400 MG/1
TABLET ORAL
Qty: 6 EACH | Refills: 6 | Status: SHIPPED | OUTPATIENT
Start: 2025-02-20

## 2025-02-20 RX ORDER — TIRZEPATIDE 5 MG/.5ML
5 INJECTION, SOLUTION SUBCUTANEOUS
Qty: 6 ML | Refills: 2 | Status: SHIPPED | OUTPATIENT
Start: 2025-02-20 | End: 2025-03-22

## 2025-02-20 ASSESSMENT — PATIENT HEALTH QUESTIONNAIRE - PHQ9: CLINICAL INTERPRETATION OF PHQ2 SCORE: 0

## 2025-02-21 NOTE — PROGRESS NOTES
Verbal Consent given for MARKO recording software    HISTORY OF PRESENT ILLNESS: Mikie is a pleasant 55 y.o. male, established patient who presents today to discuss medical problems as listed below:    History of Present Illness  The patient presents for a follow-up on his diabetes.    He uses a continuous glucose monitor, showing morning glucose levels around 190 and postprandial readings around 225. He experiences severe hunger, stomach gurgling, and headaches when glucose drops to 180. He has reduced carbohydrate intake but not eliminated them. Physical activity is limited to daily dog walks. He snacks on sugar-free foods. Issues with his glucose monitor require replacements from [facility], with two pending and a refill needed. He is interested in trying Ozempic. He is on Rybelsus 7 mg, pioglitazone, and metformin, previously on a higher metformin dose, which he tolerated well.    He has been off fenofibrate for 5 days and observed improved cholesterol levels. He is also on rosuvastatin.    MEDICATIONS  Current: Rybelsus, pioglitazone, metformin, rosuvastatin  Discontinued: Fenofibrate       Current Outpatient Medications Ordered in Epic   Medication Sig Dispense Refill    metformin (GLUCOPHAGE) 1000 MG tablet Take 1 Tablet by mouth 2 times a day with meals. 200 Tablet 2    Tirzepatide (MOUNJARO) 5 MG/0.5ML Solution Auto-injector Inject 5 mg under the skin every 7 days for 30 days. 6 mL 2    Continuous Glucose Sensor (DEXCOM G7 SENSOR) Misc For continuous glucose monitoring 6 Each 6    fenofibrate (TRICOR) 145 MG Tab Take 145 mg by mouth every day.      Omega-3 Fatty Acids (FISH OIL) 1000 MG Cap capsule Take 1 Capsule by mouth 2 times a day. 180 Capsule 2    Continuous Glucose  (DEXCOM G7 ) Device For continuous glucose monitoring 1 Each 0    lisinopril (PRINIVIL) 5 MG Tab Take 1 Tablet by mouth every day. 90 Tablet 2    fluticasone (FLONASE) 50 MCG/ACT nasal spray SPRAY 1 SPRAY INTO EACH  NOSTRIL EVERY DAY FOR 90 DAYS      rosuvastatin (CRESTOR) 40 MG tablet Take 1 Tablet by mouth every day. 100 Tablet 2     No current Epic-ordered facility-administered medications on file.       Review of systems:  Per HPI    Patient Active Problem List    Diagnosis Date Noted    Type 2 diabetes mellitus with diabetic microalbuminuria, without long-term current use of insulin ([facility]) 10/22/2024    Morbid obesity with BMI of 45.0-49.9, adult ([facility]) 10/22/2024     Past Surgical History:   Procedure Laterality Date    ORIF, ANKLE Right     TRANS URETHRAL RESECTION PROSTATE       Social History     Tobacco Use    Smoking status: Never    Smokeless tobacco: Never   Vaping Use    Vaping status: Never Used   Substance Use Topics    Alcohol use: Never    Drug use: Never      Family History   Problem Relation Age of Onset    Diabetes Mother     No Known Problems Father     Hypertension Sister     No Known Problems Brother      Current Outpatient Medications   Medication Sig Dispense Refill    metformin (GLUCOPHAGE) 1000 MG tablet Take 1 Tablet by mouth 2 times a day with meals. 200 Tablet 2    Tirzepatide (MOUNJARO) 5 MG/0.5ML Solution Auto-injector Inject 5 mg under the skin every 7 days for 30 days. 6 mL 2    Continuous Glucose Sensor (DEXCOM G7 SENSOR) Misc For continuous glucose monitoring 6 Each 6    fenofibrate (TRICOR) 145 MG Tab Take 145 mg by mouth every day.      Omega-3 Fatty Acids (FISH OIL) 1000 MG Cap capsule Take 1 Capsule by mouth 2 times a day. 180 Capsule 2    Continuous Glucose  (DEXCOM G7 ) Device For continuous glucose monitoring 1 Each 0    lisinopril (PRINIVIL) 5 MG Tab Take 1 Tablet by mouth every day. 90 Tablet 2    fluticasone (FLONASE) 50 MCG/ACT nasal spray SPRAY 1 SPRAY INTO EACH NOSTRIL EVERY DAY FOR 90 DAYS      rosuvastatin (CRESTOR) 40 MG tablet Take 1 Tablet by mouth every day. 100 Tablet 2     No current facility-administered medications for this visit.       Allergies:  No  "Known Allergies    Allergies, past medical history, past surgical history, family history, social history reviewed and updated.    Objective:    /72   Pulse 97   Temp 35.9 °C (96.7 °F) (Temporal)   Resp 14   Ht 1.803 m (5' 11\")   Wt (!) 159 kg (349 lb 13.9 oz)   SpO2 94%   BMI 48.80 kg/m²    Body mass index is 48.8 kg/m².    Physical exam:  General: Normal appearance, no acute distress, not ill-appearing  HEENT: EOM intact, conjunctiva normal limits, negative right/left eye discharge.  Sclera anicteric  Cardiovascular: Normal rate and rhythm, no murmurs  Pulmonary: No respiratory distress, no wheezing, no rales, breath sounds normal.  Abdomen: Bowel sounds normal, flat, soft.  Musculoskeletal: No edema bilaterally  Skin: Warm, dry, no lesions  Neurological: No focal deficits, normal gait  Psychiatric: Mood within normal limits    Assessment/Plan:    Assessment & Plan  1. Type 2 Diabetes Mellitus: Uncontrolled. A1c is 10.4. Morning glucose around 190, postprandial 225. On Rybelsus 7 mg, pioglitazone, and metformin.  - Increase metformin to 1000 mg twice daily  - Discontinue Rybelsus  - Start Mounjaro 5 mg weekly  - Refill continuous glucose monitor, sent to [facility]  - Monitor blood pressure when glucose drops to 180  - Maintain a healthy diet, avoid processed foods, consume small portions, and refrain from snacking  - Engage in regular exercise    2. Cholesterol management: Improved cholesterol levels. Off fenofibrate for 5 days.  - Continue rosuvastatin    3. Health maintenance.  - Recommend pneumonia vaccine due to uncontrolled diabetes. Administer at next visit in 30 days.    Follow-up  - Follow-up in 1 month       Problem List Items Addressed This Visit       Type 2 diabetes mellitus with diabetic microalbuminuria, without long-term current use of insulin (HCC)    Relevant Medications    metformin (GLUCOPHAGE) 1000 MG tablet    Tirzepatide (MOUNJARO) 5 MG/0.5ML Solution Auto-injector    Continuous " Glucose Sensor (DEXCOM G7 SENSOR) Misc     Other Visit Diagnoses         Type 2 diabetes mellitus without complication, without long-term current use of insulin (HCC)        Relevant Medications    metformin (GLUCOPHAGE) 1000 MG tablet    Tirzepatide (MOUNJARO) 5 MG/0.5ML Solution Auto-injector    Continuous Glucose Sensor (DEXCOM G7 SENSOR) Misc            Return in about 4 weeks (around 3/20/2025), or if symptoms worsen or fail to improve.

## 2025-02-27 ENCOUNTER — RESULTS FOLLOW-UP (OUTPATIENT)
Dept: MEDICAL GROUP | Facility: PHYSICIAN GROUP | Age: 55
End: 2025-02-27

## 2025-03-21 ENCOUNTER — TELEMEDICINE (OUTPATIENT)
Dept: MEDICAL GROUP | Facility: PHYSICIAN GROUP | Age: 55
End: 2025-03-21
Payer: COMMERCIAL

## 2025-03-21 VITALS — BODY MASS INDEX: 48.8 KG/M2 | HEIGHT: 71 IN

## 2025-03-21 DIAGNOSIS — E11.29 TYPE 2 DIABETES MELLITUS WITH DIABETIC MICROALBUMINURIA, WITHOUT LONG-TERM CURRENT USE OF INSULIN (HCC): ICD-10-CM

## 2025-03-21 DIAGNOSIS — R80.9 TYPE 2 DIABETES MELLITUS WITH DIABETIC MICROALBUMINURIA, WITHOUT LONG-TERM CURRENT USE OF INSULIN (HCC): ICD-10-CM

## 2025-03-21 PROCEDURE — 99213 OFFICE O/P EST LOW 20 MIN: CPT | Performed by: STUDENT IN AN ORGANIZED HEALTH CARE EDUCATION/TRAINING PROGRAM

## 2025-03-21 NOTE — PROGRESS NOTES
Virtual Visit: Established Patient   This visit was conducted via Teams using secure and encrypted videoconferencing technology.   The patient was in their home in the [facility].    The patient's identity was confirmed and verbal consent was obtained for this virtual visit.    Subjective:   CC:   Chief Complaint   Patient presents with    Blood Sugar Problem     3/21 Blood sugar 249 before 175      Kenneth Warren Cluer is a 55 y.o. male presenting for evaluation and management of:    History of Present Illness  The patient presents via virtual visit to discuss his type 2 diabetes.    At the last visit, his A1c was 10.4 and it was uncontrolled. Rybelsus was stopped, and he started Mounjaro. Metformin was increased to 1000 mg twice a day. He has been on Mounjaro for 2 weeks, reporting it helps manage his appetite, leading to dietary changes: replacing breakfast and lunch with a protein shake and a protein bar with sunflower seeds. He increased physical activity by walking more, including walking his dog and taking laps at work. He feels lighter but is unsure if it's significant. His Dexcom sensor showed morning blood glucose levels around 154, rising to 170s postprandially.  He just consumed a sandwich, expecting a temporary spike. 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 He is reducing carbohydrate intake and improving his diet.    MEDICATIONS  Current: metformin, Mounjaro  Discontinued: Rybelsus        ROS   Per hpi    Current medicines (including changes today)  Current Outpatient Medications   Medication Sig Dispense Refill    metformin (GLUCOPHAGE) 1000 MG tablet Take 1 Tablet by mouth 2 times a day with meals. 200 Tablet 2    Tirzepatide (MOUNJARO) 5 MG/0.5ML Solution Auto-injector Inject 5 mg under the skin every 7 days for 30 days. 6 mL 2    Continuous Glucose Sensor (DEXCOM G7 SENSOR) [facility] For continuous glucose monitoring 6 Each 6    Omega-3 Fatty Acids  "(FISH OIL) 1000 MG Cap capsule Take 1 Capsule by mouth 2 times a day. 180 Capsule 2    Continuous Glucose  (DEXCOM G7 ) Device For continuous glucose monitoring 1 Each 0    lisinopril (PRINIVIL) 5 MG Tab Take 1 Tablet by mouth every day. 90 Tablet 2    fluticasone (FLONASE) 50 MCG/ACT nasal spray SPRAY 1 SPRAY INTO EACH NOSTRIL EVERY DAY FOR 90 DAYS      rosuvastatin (CRESTOR) 40 MG tablet Take 1 Tablet by mouth every day. 100 Tablet 2     No current facility-administered medications for this visit.       Patient Active Problem List    Diagnosis Date Noted    Type 2 diabetes mellitus with diabetic microalbuminuria, without long-term current use of insulin ([facility]) 10/22/2024    Morbid obesity with BMI of 45.0-49.9, adult ([facility]) 10/22/2024        Objective:   Ht 1.803 m (5' 11\")   BMI 48.80 kg/m²     Physical Exam:  Constitutional: Alert, no distress, well-groomed.  Skin: No rashes in visible areas.  Eye: Round. Conjunctiva clear, lids normal. No icterus.   ENMT: Lips pink without lesions, good dentition, moist mucous membranes. Phonation normal.  Neck: No masses, no thyromegaly. Moves freely without pain.  Respiratory: Unlabored respiratory effort, no cough or audible wheeze  Psych: Alert and oriented x3, normal affect and mood.     Assessment and Plan:   The following treatment plan was discussed:     Assessment & Plan  1. Type 2 diabetes mellitus: Significant improvement since the last consultation. Tolerating Mounjaro well with dietary changes and increased physical activity. Blood sugar levels improved: morning readings around 154 and postprandial readings in the 170s.  - Continue current regimen: Mounjaro injections and metformin 1000 mg twice a day  - Gradually increase exercise tolerance  - Maintain a balanced diet with reduced carbohydrate intake  - Adjust Mounjaro dosage if necessary based on fasting and postprandial blood glucose levels  - Seek medical attention if blood glucose control is " lost    Follow-up  - Schedule a virtual follow-up in 1 month to reassess fasting and postprandial blood glucose levels        Follow-up: Return in about 4 weeks (around 4/18/2025), or if symptoms worsen or fail to improve, for diabetes.

## 2025-05-27 DIAGNOSIS — R80.9 TYPE 2 DIABETES MELLITUS WITH DIABETIC MICROALBUMINURIA, WITHOUT LONG-TERM CURRENT USE OF INSULIN (HCC): ICD-10-CM

## 2025-05-27 DIAGNOSIS — E11.29 TYPE 2 DIABETES MELLITUS WITH DIABETIC MICROALBUMINURIA, WITHOUT LONG-TERM CURRENT USE OF INSULIN (HCC): ICD-10-CM

## 2025-05-29 ENCOUNTER — TELEPHONE (OUTPATIENT)
Dept: HEALTH INFORMATION MANAGEMENT | Facility: OTHER | Age: 55
End: 2025-05-29
Payer: COMMERCIAL

## 2025-05-29 DIAGNOSIS — E11.29 TYPE 2 DIABETES MELLITUS WITH DIABETIC MICROALBUMINURIA, WITHOUT LONG-TERM CURRENT USE OF INSULIN (HCC): Primary | ICD-10-CM

## 2025-05-29 DIAGNOSIS — R80.9 TYPE 2 DIABETES MELLITUS WITH DIABETIC MICROALBUMINURIA, WITHOUT LONG-TERM CURRENT USE OF INSULIN (HCC): Primary | ICD-10-CM

## 2025-07-10 ENCOUNTER — OFFICE VISIT (OUTPATIENT)
Dept: MEDICAL GROUP | Facility: MEDICAL CENTER | Age: 55
End: 2025-07-10
Payer: COMMERCIAL

## 2025-07-10 VITALS
SYSTOLIC BLOOD PRESSURE: 134 MMHG | WEIGHT: 314.38 LBS | DIASTOLIC BLOOD PRESSURE: 73 MMHG | HEART RATE: 85 BPM | HEIGHT: 71 IN | BODY MASS INDEX: 44.01 KG/M2

## 2025-07-10 DIAGNOSIS — R80.9 TYPE 2 DIABETES MELLITUS WITH DIABETIC MICROALBUMINURIA, WITHOUT LONG-TERM CURRENT USE OF INSULIN (HCC): Primary | ICD-10-CM

## 2025-07-10 DIAGNOSIS — E11.29 TYPE 2 DIABETES MELLITUS WITH DIABETIC MICROALBUMINURIA, WITHOUT LONG-TERM CURRENT USE OF INSULIN (HCC): Primary | ICD-10-CM

## 2025-07-10 LAB
HBA1C MFR BLD: 8.2 % (ref ?–5.8)
POCT INT CON NEG: NEGATIVE
POCT INT CON POS: POSITIVE

## 2025-07-10 PROCEDURE — 3075F SYST BP GE 130 - 139MM HG: CPT | Performed by: STUDENT IN AN ORGANIZED HEALTH CARE EDUCATION/TRAINING PROGRAM

## 2025-07-10 PROCEDURE — 99402 PREV MED CNSL INDIV APPRX 30: CPT | Performed by: STUDENT IN AN ORGANIZED HEALTH CARE EDUCATION/TRAINING PROGRAM

## 2025-07-10 PROCEDURE — 3078F DIAST BP <80 MM HG: CPT | Performed by: STUDENT IN AN ORGANIZED HEALTH CARE EDUCATION/TRAINING PROGRAM

## 2025-07-10 PROCEDURE — 83036 HEMOGLOBIN GLYCOSYLATED A1C: CPT

## 2025-07-10 RX ORDER — TIRZEPATIDE 10 MG/.5ML
10 INJECTION, SOLUTION SUBCUTANEOUS
Qty: 6 ML | Refills: 0 | Status: SHIPPED | OUTPATIENT
Start: 2025-07-10 | End: 2025-07-10

## 2025-07-10 NOTE — PROGRESS NOTES
"Patient Consult Note    TIME IN: 1:30 PM  TIME OUT: 2:07 PM    Primary care physician: Martell Paige D.O.    Reason for Consult: Management of Uncontrolled Type 2 Diabetes    Date Referral Placed: 5/27/25    HPI:  Kenneth Warren Cluer is a 55 y.o. old patient who comes in today for evaluation of above stated problem.    Allergies  Patient has no known allergies.    Current Diabetes Medication Regimen  Metformin IR: 1000 mg BID  GLP-1 or GLP-1/GIP Agent: tirzepatide (Mounjaro) 5 mg weekly    Previous Diabetes Medications and Reason for Discontinuation  Rybelsus - ineffective  Farxiga - MD stopped when Mounjaro started  Januvia - MD stopped with initiation  Pioglitazone/metformin - issue    Potential Barriers to Care:  Adherence: denies missed doses  Side effects: none  Affordability: No issues    SMBG  Pt has home glucometer and proper testing technique - yes    Pt reports blood sugars:   Before Breakfast: 150-185    Hypoglycemia  Hypoglycemia awareness: Yes  Nocturnal hypoglycemia: None  Hypoglycemia:  None  Pt's treatment of Hypoglycemia  Discussed 15:15 Rule    Lifestyle  Current Exercise - walks dog for 15 minutes day    Dietary - cutting carbs out  Breakfast - protein shake  Lunch - chicken strips, carrot sticks  Dinner - salad, protein  Snacks - sunflower seeds  Drinks - diet Dr disha lord every morning, gallon of peach tea per day (zero sugar/calorie)    Labs  Lab Results   Component Value Date/Time    HBA1C 8.2 (A) 07/10/2025 01:50 PM    HBA1C 9.4 (A) 11/05/2024 08:47 AM    HBA1C 7.0 (H) 10/10/2013 10:09 AM      Lab Results   Component Value Date/Time    SODIUM 135 10/10/2013 10:09 AM    POTASSIUM 4.3 10/10/2013 10:09 AM    CHLORIDE 102 10/10/2013 10:09 AM    CO2 26 10/10/2013 10:09 AM    GLUCOSE 131 (H) 10/10/2013 10:09 AM    BUN 9 10/10/2013 10:09 AM    CREATININE 0.90 10/10/2013 10:09 AM     No results found for: \"ALKPHOSPHAT\", \"ASTSGOT\", \"ALTSGPT\", \"TBILIRUBIN\", \"INR\", \"ALBUMIN\"   Lab Results " "  Component Value Date/Time    CHOLSTRLTOT 207 (H) 10/10/2013 10:09 AM     (H) 10/10/2013 10:09 AM    HDL 34 (A) 10/10/2013 10:09 AM    TRIGLYCERIDE 177 (H) 10/10/2013 10:09 AM       Lab Results   Component Value Date/Time    MALBCRT 54 (H) 10/22/2024 08:52 AM    MICROALBUR 4.2 10/22/2024 08:52 AM       Physical Examination:  Vital signs: /73   Pulse 85   Ht 1.803 m (5' 11\")   Wt (!) 143 kg (314 lb 6 oz)   BMI 43.85 kg/m²  Body mass index is 43.85 kg/m².    Assessment and Plan:    1. DM2  Basic physiology of DMII was explained to patient as well as microvascular/macrovascular complications. The importance of increasing physical activity to improve diabetes control was discussed with the patient. Patient was also educated on changing diet and making better choices to help control blood sugar.  Discussed Goals: FBG 80 - 130, 2hPP < 180, a1c < 7.0%  Last a1c drawn today was 8.2%, which is not at goal and has improved since the previous reading  Reported FBG are above goal but states they have decreased from previous  Patient would like to increase dose of GLP1 to promote better glycemic control and weight loss. Will increase dose. Discussed potential side effects.   Patient has a goal of less than 300 lbs so that he is able to use gym equipment. Encouraged patient to increase amount of daily walking to also help promote increased metabolism.     - Medication changes:  Increase Mounjaro 7.5 mg    - Continue:  Metformin 1000 mg BID     - Lifestyle changes:  Exercise Goal - Increase walking to 30 minutes daily - begin gym routine once able  Dietary Goal - Continue reduced carb - increase protein intake    - Preventative management:  REC DM Score: 2  Care gaps addressed:   A1c is above 8%: Optimized DM medications/management  Care gaps updated in Health Maintenance    Follow Up:  2 months    Heaven Navarro, PharmD    CC:   Martell Paige D.O.  Nabor Overton MD  "

## 2025-07-14 DIAGNOSIS — E11.29 TYPE 2 DIABETES MELLITUS WITH DIABETIC MICROALBUMINURIA, WITHOUT LONG-TERM CURRENT USE OF INSULIN (HCC): ICD-10-CM

## 2025-07-14 DIAGNOSIS — R80.9 TYPE 2 DIABETES MELLITUS WITH DIABETIC MICROALBUMINURIA, WITHOUT LONG-TERM CURRENT USE OF INSULIN (HCC): ICD-10-CM

## 2025-07-16 RX ORDER — LISINOPRIL 5 MG/1
5 TABLET ORAL DAILY
Qty: 90 TABLET | Refills: 3 | Status: SHIPPED | OUTPATIENT
Start: 2025-07-16

## 2025-07-21 NOTE — TELEPHONE ENCOUNTER
Received request via: Pharmacy    Was the patient seen in the last year in this department? Yes    Does the patient have an active prescription (recently filled or refills available) for medication(s) requested? No    [facility]     Does the patient have [facility] and need 100-day supply? (This applies to ALL medications) Patient does not have SCP

## 2025-07-22 RX ORDER — ROSUVASTATIN CALCIUM 40 MG/1
40 TABLET, COATED ORAL DAILY
Qty: 90 TABLET | Refills: 3 | Status: SHIPPED | OUTPATIENT
Start: 2025-07-22